# Patient Record
Sex: FEMALE | Race: WHITE | NOT HISPANIC OR LATINO | URBAN - METROPOLITAN AREA
[De-identification: names, ages, dates, MRNs, and addresses within clinical notes are randomized per-mention and may not be internally consistent; named-entity substitution may affect disease eponyms.]

---

## 2017-05-18 ENCOUNTER — IMPORTED ENCOUNTER (OUTPATIENT)
Dept: URBAN - METROPOLITAN AREA CLINIC 50 | Facility: CLINIC | Age: 82
End: 2017-05-18

## 2017-05-22 ENCOUNTER — IMPORTED ENCOUNTER (OUTPATIENT)
Dept: URBAN - METROPOLITAN AREA CLINIC 50 | Facility: CLINIC | Age: 82
End: 2017-05-22

## 2018-05-17 ENCOUNTER — IMPORTED ENCOUNTER (OUTPATIENT)
Dept: URBAN - METROPOLITAN AREA CLINIC 50 | Facility: CLINIC | Age: 83
End: 2018-05-17

## 2018-05-17 PROBLEM — Z98.890: Noted: 2018-05-17

## 2018-05-31 ENCOUNTER — IMPORTED ENCOUNTER (OUTPATIENT)
Dept: URBAN - METROPOLITAN AREA CLINIC 50 | Facility: CLINIC | Age: 83
End: 2018-05-31

## 2019-05-30 ENCOUNTER — IMPORTED ENCOUNTER (OUTPATIENT)
Dept: URBAN - METROPOLITAN AREA CLINIC 50 | Facility: CLINIC | Age: 84
End: 2019-05-30

## 2020-09-30 ENCOUNTER — IMPORTED ENCOUNTER (OUTPATIENT)
Dept: URBAN - METROPOLITAN AREA CLINIC 50 | Facility: CLINIC | Age: 85
End: 2020-09-30

## 2021-04-17 ASSESSMENT — TONOMETRY
OD_IOP_MMHG: 16
OS_IOP_MMHG: 14
OD_IOP_MMHG: 15
OS_IOP_MMHG: 14
OD_IOP_MMHG: 14
OD_IOP_MMHG: 15
OD_IOP_MMHG: 16
OD_IOP_MMHG: 14
OS_IOP_MMHG: 15
OS_IOP_MMHG: 15
OS_IOP_MMHG: 16

## 2021-04-17 ASSESSMENT — VISUAL ACUITY
OS_SC: 20/50
OS_SC: 20/30
OD_CC: J1+
OS_SC: 20/30+
OS_CC: J1+
OD_SC: 20/60
OD_OTHER: >20/400.
OS_SC: 20/30
OS_CC: J1+
OD_PH: 20/40
OD_SC: 20/70
OS_CC: J1+
OD_CC: J1+
OD_SC: 20/30-
OD_CC: J1+
OS_SC: 20/30
OD_SC: 20/30-
OS_CC: J1+
OD_SC: 20/30
OD_BAT: >20/400
OD_CC: J1+

## 2021-11-16 ENCOUNTER — PREPPED CHART (OUTPATIENT)
Dept: URBAN - METROPOLITAN AREA CLINIC 53 | Facility: CLINIC | Age: 86
End: 2021-11-16

## 2021-11-18 ENCOUNTER — ANNUAL COMPREHENSIVE EXAM (OUTPATIENT)
Dept: URBAN - METROPOLITAN AREA CLINIC 53 | Facility: CLINIC | Age: 86
End: 2021-11-18

## 2021-11-18 DIAGNOSIS — D31.32: ICD-10-CM

## 2021-11-18 DIAGNOSIS — D31.31: ICD-10-CM

## 2021-11-18 DIAGNOSIS — H43.811: ICD-10-CM

## 2021-11-18 PROCEDURE — 92134 CPTRZ OPH DX IMG PST SGM RTA: CPT

## 2021-11-18 PROCEDURE — 92014 COMPRE OPH EXAM EST PT 1/>: CPT

## 2021-11-18 ASSESSMENT — VISUAL ACUITY
OS_PH: 20/40
OD_CC: J2 @ 14IN
OU_CC: J2 @ 14IN
OS_CC: J2 @ 14IN
OU_SC: 20/40-2
OD_SC: 20/50-1
OS_SC: 20/50
OD_PH: 20/40

## 2021-11-18 ASSESSMENT — TONOMETRY
OS_IOP_MMHG: 14
OD_IOP_MMHG: 14

## 2021-12-23 ENCOUNTER — CONTACT LENSES/GLASSES VISIT (OUTPATIENT)
Dept: URBAN - METROPOLITAN AREA CLINIC 48 | Facility: CLINIC | Age: 86
End: 2021-12-23

## 2021-12-23 DIAGNOSIS — H52.4: ICD-10-CM

## 2021-12-23 PROCEDURE — 92015 DETERMINE REFRACTIVE STATE: CPT

## 2021-12-23 ASSESSMENT — VISUAL ACUITY
OU_CC: J1
OD_SC: 20/30-2
OS_SC: 20/40-2
OU_SC: 20/30+2
OS_PH: 20/30

## 2021-12-23 NOTE — PATIENT DISCUSSION
Patient given Rx for glasses. Advised patient to address MAL first before getting glasses Rx but patient wanted glasses Rx today.

## 2022-03-16 ENCOUNTER — ESTABLISHED PATIENT (OUTPATIENT)
Dept: URBAN - METROPOLITAN AREA CLINIC 49 | Facility: CLINIC | Age: 87
End: 2022-03-16

## 2022-03-16 DIAGNOSIS — H04.123: ICD-10-CM

## 2022-03-16 PROCEDURE — 92012 INTRM OPH EXAM EST PATIENT: CPT

## 2022-03-16 ASSESSMENT — VISUAL ACUITY
OD_CC: 20/30-
OS_CC: 20/40

## 2022-03-16 ASSESSMENT — TONOMETRY
OD_IOP_MMHG: 14
OS_IOP_MMHG: 14

## 2022-03-16 NOTE — PATIENT DISCUSSION
Patient states blurred vision started when she switched to PF AT's. Will recommend switching back to regular AT's OU TID. Advised can discontinue warm compresses.

## 2022-12-08 ENCOUNTER — COMPREHENSIVE EXAM (OUTPATIENT)
Dept: URBAN - METROPOLITAN AREA CLINIC 53 | Facility: CLINIC | Age: 87
End: 2022-12-08

## 2022-12-08 PROCEDURE — 92014 COMPRE OPH EXAM EST PT 1/>: CPT

## 2022-12-08 ASSESSMENT — VISUAL ACUITY
OU_CC: 20/30+2
OD_SC: 20/100+2
OU_SC: 20/80
OS_CC: 20/30-2
OS_SC: 20/80
OD_CC: 20/40+2
OU_CC: J1+@ 14 IN

## 2022-12-08 ASSESSMENT — TONOMETRY
OD_IOP_MMHG: 17
OS_IOP_MMHG: 15

## 2023-12-21 ENCOUNTER — COMPREHENSIVE EXAM (OUTPATIENT)
Dept: URBAN - METROPOLITAN AREA CLINIC 53 | Facility: CLINIC | Age: 88
End: 2023-12-21

## 2023-12-21 DIAGNOSIS — H18.592: ICD-10-CM

## 2023-12-21 DIAGNOSIS — D31.32: ICD-10-CM

## 2023-12-21 DIAGNOSIS — D31.31: ICD-10-CM

## 2023-12-21 DIAGNOSIS — H43.392: ICD-10-CM

## 2023-12-21 DIAGNOSIS — H43.811: ICD-10-CM

## 2023-12-21 DIAGNOSIS — H04.123: ICD-10-CM

## 2023-12-21 DIAGNOSIS — H52.4: ICD-10-CM

## 2023-12-21 PROCEDURE — 92134 CPTRZ OPH DX IMG PST SGM RTA: CPT

## 2023-12-21 PROCEDURE — 92014 COMPRE OPH EXAM EST PT 1/>: CPT

## 2023-12-21 PROCEDURE — 92015 DETERMINE REFRACTIVE STATE: CPT

## 2023-12-21 ASSESSMENT — VISUAL ACUITY
OS_CC: 20/40-1
OD_CC: 20/60
OD_PH: 20/40-1
OU_CC: J1+-2@16"

## 2023-12-21 ASSESSMENT — TONOMETRY
OD_IOP_MMHG: 18
OS_IOP_MMHG: 17

## 2025-01-20 ENCOUNTER — COMPREHENSIVE EXAM (OUTPATIENT)
Age: 89
End: 2025-01-20

## 2025-01-20 DIAGNOSIS — D31.31: ICD-10-CM

## 2025-01-20 DIAGNOSIS — H52.4: ICD-10-CM

## 2025-01-20 DIAGNOSIS — H04.123: ICD-10-CM

## 2025-01-20 DIAGNOSIS — D31.32: ICD-10-CM

## 2025-01-20 DIAGNOSIS — H43.813: ICD-10-CM

## 2025-01-20 DIAGNOSIS — H18.592: ICD-10-CM

## 2025-01-20 PROCEDURE — 92134 CPTRZ OPH DX IMG PST SGM RTA: CPT

## 2025-01-20 PROCEDURE — 99214 OFFICE O/P EST MOD 30 MIN: CPT

## 2025-01-20 PROCEDURE — 92015 DETERMINE REFRACTIVE STATE: CPT
